# Patient Record
Sex: MALE
[De-identification: names, ages, dates, MRNs, and addresses within clinical notes are randomized per-mention and may not be internally consistent; named-entity substitution may affect disease eponyms.]

---

## 2018-02-14 ENCOUNTER — TRANSCRIPTION ENCOUNTER (OUTPATIENT)
Age: 53
End: 2018-02-14

## 2018-10-24 ENCOUNTER — TRANSCRIPTION ENCOUNTER (OUTPATIENT)
Age: 53
End: 2018-10-24

## 2021-05-17 ENCOUNTER — TRANSCRIPTION ENCOUNTER (OUTPATIENT)
Age: 56
End: 2021-05-17

## 2021-05-18 ENCOUNTER — TRANSCRIPTION ENCOUNTER (OUTPATIENT)
Age: 56
End: 2021-05-18

## 2021-06-07 ENCOUNTER — APPOINTMENT (OUTPATIENT)
Dept: UROLOGY | Facility: CLINIC | Age: 56
End: 2021-06-07
Payer: COMMERCIAL

## 2021-06-07 VITALS
SYSTOLIC BLOOD PRESSURE: 133 MMHG | TEMPERATURE: 98.3 F | RESPIRATION RATE: 16 BRPM | HEART RATE: 71 BPM | DIASTOLIC BLOOD PRESSURE: 87 MMHG

## 2021-06-07 DIAGNOSIS — Z00.00 ENCOUNTER FOR GENERAL ADULT MEDICAL EXAMINATION W/OUT ABNORMAL FINDINGS: ICD-10-CM

## 2021-06-07 DIAGNOSIS — Z78.9 OTHER SPECIFIED HEALTH STATUS: ICD-10-CM

## 2021-06-07 DIAGNOSIS — N40.1 BENIGN PROSTATIC HYPERPLASIA WITH LOWER URINARY TRACT SYMPMS: ICD-10-CM

## 2021-06-07 LAB
ANION GAP SERPL CALC-SCNC: 13 MMOL/L
APPEARANCE: CLEAR
BACTERIA: NEGATIVE
BILIRUBIN URINE: NEGATIVE
BLOOD URINE: NEGATIVE
BUN SERPL-MCNC: 21 MG/DL
CALCIUM SERPL-MCNC: 9.2 MG/DL
CHLORIDE SERPL-SCNC: 104 MMOL/L
CO2 SERPL-SCNC: 21 MMOL/L
COLOR: YELLOW
CREAT SERPL-MCNC: 0.93 MG/DL
GLUCOSE QUALITATIVE U: NEGATIVE
GLUCOSE SERPL-MCNC: 91 MG/DL
HYALINE CASTS: 1 /LPF
KETONES URINE: ABNORMAL
LEUKOCYTE ESTERASE URINE: NEGATIVE
MICROSCOPIC-UA: NORMAL
NITRITE URINE: NEGATIVE
PH URINE: 5.5
POTASSIUM SERPL-SCNC: 4.3 MMOL/L
PROTEIN URINE: NEGATIVE
PSA FREE FLD-MCNC: 23 %
PSA FREE SERPL-MCNC: 0.17 NG/ML
PSA SERPL-MCNC: 0.75 NG/ML
RED BLOOD CELLS URINE: 1 /HPF
SODIUM SERPL-SCNC: 138 MMOL/L
SPECIFIC GRAVITY URINE: 1.03
SQUAMOUS EPITHELIAL CELLS: 0 /HPF
UROBILINOGEN URINE: NORMAL
WHITE BLOOD CELLS URINE: 1 /HPF

## 2021-06-07 PROCEDURE — 99204 OFFICE O/P NEW MOD 45 MIN: CPT

## 2021-06-07 NOTE — LETTER BODY
[FreeTextEntry1] : Denys Lama MD\par [address] [facility], \[place] [address]\par [phone]\par \par Dear Dr. 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 He is here today for evaluation. Patient reports he has minimal symptoms of weak uroflow, frequency, and hesitancy. He reports nocturia.  He denies any hematuria or urinary incontinence. His symptoms are aggravated by hydration. He denies any alleviating factors. He has not tried any medical therapy previously. He reports no pain. All other review of systems are negative. He has no cancer in his family medical history. He has previous surgical history. Past medical history, family history and social history were inquired and were noncontributory to current condition. The patient does not use tobacco. The patient drinks alcohol socially. Medications and allergies were reviewed. He has no known allergies to medication. \par \par On examination, the patient is a healthy-appearing gentleman in no acute distress. He is alert and oriented follows commands. He has normal mood and affect. He is normocephalic. Neck is supple. Oral no thrush Respirations are unlabored. His abdomen is soft and nontender. Bladder is nonpalpable. No CVA tenderness. Neurologically he is grossly intact. No peripheral edema. Skin without gross abnormality. He has normal male external genitalia. Normal meatus. Bilateral testes are descended intrascrotally and normal to palpation. On rectal examination, there is normal sphincter tone. The prostate is clinically benign without focal induration or nodularity.\par \par ASSESSMENT: BPH.\par \par I counseled the patient on the various etiology of his symptoms. I discussed the natural history of BPH and the treatment options available. I discussed the options of conservative management with fluid in dietary restrictions, herbal therapy, medical therapy, and minimally invasive procedures. I recommended if his symptoms worsen to consider medical therapy. I also recommended the patient obtain PSA and BMP along with urinalysis to establish baselines. Risk and benefits were discussed. I answered his questions. The patient will follow-up as directed and will contact me with any questions or concerns. Thank you for the opportunity to participate in the care of Mr. SCHMIDT. I will keep you updated on his progress.\par \par Plan: Trial of Flomax. PSA. BMP. Urinalysis. Follow up as directed.

## 2021-06-07 NOTE — REVIEW OF SYSTEMS
[Sexually Transmitted Disease (STD)] : sexually transmitted disease [Strain or push to urinate] : strain or push to urinate [Negative] : Heme/Lymph [FreeTextEntry1] : herpes

## 2021-06-07 NOTE — ADDENDUM
[FreeTextEntry1] : Entered by Shane Bolton, acting as scribe for Dr. Bharat Guzmán.\par \par The documentation recorded by the scribe accurately reflects the service I personally performed and the decisions made by me.

## 2021-06-22 ENCOUNTER — TRANSCRIPTION ENCOUNTER (OUTPATIENT)
Age: 56
End: 2021-06-22

## 2024-07-11 ENCOUNTER — APPOINTMENT (OUTPATIENT)
Dept: UROLOGY | Facility: CLINIC | Age: 59
End: 2024-07-11
Payer: COMMERCIAL

## 2024-07-11 VITALS
SYSTOLIC BLOOD PRESSURE: 140 MMHG | WEIGHT: 210 LBS | DIASTOLIC BLOOD PRESSURE: 84 MMHG | BODY MASS INDEX: 30.06 KG/M2 | HEIGHT: 70 IN | TEMPERATURE: 98 F

## 2024-07-11 VITALS — HEIGHT: 70 IN | BODY MASS INDEX: 30.06 KG/M2 | WEIGHT: 210 LBS

## 2024-07-11 DIAGNOSIS — N48.89 OTHER SPECIFIED DISORDERS OF PENIS: ICD-10-CM

## 2024-07-11 DIAGNOSIS — Z00.00 ENCOUNTER FOR GENERAL ADULT MEDICAL EXAMINATION W/OUT ABNORMAL FINDINGS: ICD-10-CM

## 2024-07-11 DIAGNOSIS — N40.1 BENIGN PROSTATIC HYPERPLASIA WITH LOWER URINARY TRACT SYMPMS: ICD-10-CM

## 2024-07-11 DIAGNOSIS — N48.6 INDURATION PENIS PLASTICA: ICD-10-CM

## 2024-07-11 PROCEDURE — 99204 OFFICE O/P NEW MOD 45 MIN: CPT

## 2024-07-22 ENCOUNTER — APPOINTMENT (OUTPATIENT)
Dept: UROLOGY | Facility: CLINIC | Age: 59
End: 2024-07-22

## 2024-07-22 NOTE — ASSESSMENT
[FreeTextEntry1] : PENILE INJECTION TEST:  WILL SCHMIDT  07/22/2024   The patient was placed supine on the procedure table.  The left side of the penis was prepped with alcohol, and the patient received 20 mcg @ 1 cc of Alprostadil. The patient tolerated the injection well.  No bleeding occurred at the injection site.     The patient was examined at 5, 10, 30 and 45 minutes interval post injection:   The patients penis was:      cm stretched  Deformity: Narrowing:  An hour glass deformity:  Curvature:  Post Injection Erectile Response: At 5 minutes:      % rigid erection was noted.  At 15 minutes:   % rigidity.   At 30 minutes:     % rigidity.  Spontaneous detumescence occurred after 60 minutes.    The patient was discharged with a soft erection and was advised to call should his erection become more rigid.  Post response evaluation by the patient: The patient described that this erection was better than his sexually induced erections.   The erection was not adequate for vaginal penetration. Impression:         Severe organic erectile dysfunction.  Recommendation:   Insertion of inflatable implant  PENILE DUPLEX SONOGRAPHY WITH PULSED DOPPLER ANALYSIS: Procedure description: The flaccid penis was scanned with the 18 MHz probe and images obtained longitudinally.  The diameters of the corporal arteries were measured:   The right cavernosal artery measured:  mm The left cavernosal artery measured:    mm   Following intracavernous injection of alprostadil       microgram/ml in     ml, the penis was rescanned and the diameter of the cavernosal arteries were measured again to assess distensibility in response to the vasoactive medication. (A 100% increase in diameter is normally expected.)   The left cavernosal artery measured:   mm The right cavernosal artery measured:   mm   Pulsed Doppler analysis: Each of the selective imaged arteries underwent penile Doppler analysis with generation of waveform. The peak velocity data of the cavernosal arteries is tabulated below: (A velocity of 35 cm/s or more is normally expected.)  Resistive index parameters were obtained bilaterally (normal is 100%):   Results: Left cavernosal artery peak systolic velocity at 15 minutes:   centimeters per second Let cavernosal end-diastolic velocity at 15 minutes:                centimeters per second Left cavernosal artery resistive index:       %   Right cavernosal artery peak systolic velocity at 15 minutes:   centimeters per second Right end-diastolic velocity at 15 minutes:                                 centimeters per second Right cavernosal artery resistive index:      %   Spontaneous detumescence occurred after 60  minutes The patient was discharged with a soft erection and was advised to call should an erection persist for more than 4 hours.    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      After the Duplex study was terminated, I sat with the patient for 45 minutes and I explained to him the findings of the study. I also discussed his diagnosis with him as well as the recommended course of action. He understands the reason why he has ED and agrees with the plan of action.